# Patient Record
Sex: MALE | Race: ASIAN | NOT HISPANIC OR LATINO | URBAN - METROPOLITAN AREA
[De-identification: names, ages, dates, MRNs, and addresses within clinical notes are randomized per-mention and may not be internally consistent; named-entity substitution may affect disease eponyms.]

---

## 2023-07-18 ENCOUNTER — INPATIENT (INPATIENT)
Facility: HOSPITAL | Age: 53
LOS: 1 days | Discharge: HOME | End: 2023-07-20
Attending: INTERNAL MEDICINE | Admitting: INTERNAL MEDICINE
Payer: COMMERCIAL

## 2023-07-18 VITALS
DIASTOLIC BLOOD PRESSURE: 98 MMHG | TEMPERATURE: 98 F | HEIGHT: 67 IN | OXYGEN SATURATION: 98 % | HEART RATE: 78 BPM | SYSTOLIC BLOOD PRESSURE: 166 MMHG | RESPIRATION RATE: 18 BRPM | WEIGHT: 160.06 LBS

## 2023-07-18 LAB
ALBUMIN SERPL ELPH-MCNC: 4.4 G/DL — SIGNIFICANT CHANGE UP (ref 3.3–5)
ALP SERPL-CCNC: 94 U/L — SIGNIFICANT CHANGE UP (ref 40–120)
ALT FLD-CCNC: 46 U/L — SIGNIFICANT CHANGE UP (ref 12–78)
ANION GAP SERPL CALC-SCNC: 5 MMOL/L — SIGNIFICANT CHANGE UP (ref 5–17)
APPEARANCE UR: CLEAR — SIGNIFICANT CHANGE UP
AST SERPL-CCNC: 35 U/L — SIGNIFICANT CHANGE UP (ref 15–37)
BACTERIA # UR AUTO: NEGATIVE — SIGNIFICANT CHANGE UP
BASOPHILS # BLD AUTO: 0.09 K/UL — SIGNIFICANT CHANGE UP (ref 0–0.2)
BASOPHILS NFR BLD AUTO: 0.6 % — SIGNIFICANT CHANGE UP (ref 0–2)
BILIRUB SERPL-MCNC: 0.8 MG/DL — SIGNIFICANT CHANGE UP (ref 0.2–1.2)
BILIRUB UR-MCNC: NEGATIVE — SIGNIFICANT CHANGE UP
BUN SERPL-MCNC: 23 MG/DL — SIGNIFICANT CHANGE UP (ref 7–23)
CALCIUM SERPL-MCNC: 9.5 MG/DL — SIGNIFICANT CHANGE UP (ref 8.5–10.1)
CHLORIDE SERPL-SCNC: 101 MMOL/L — SIGNIFICANT CHANGE UP (ref 96–108)
CO2 SERPL-SCNC: 29 MMOL/L — SIGNIFICANT CHANGE UP (ref 22–31)
COLOR SPEC: YELLOW — SIGNIFICANT CHANGE UP
CREAT SERPL-MCNC: 1.23 MG/DL — SIGNIFICANT CHANGE UP (ref 0.5–1.3)
DIFF PNL FLD: NEGATIVE — SIGNIFICANT CHANGE UP
EGFR: 70 ML/MIN/1.73M2 — SIGNIFICANT CHANGE UP
EOSINOPHIL # BLD AUTO: 0.02 K/UL — SIGNIFICANT CHANGE UP (ref 0–0.5)
EOSINOPHIL NFR BLD AUTO: 0.1 % — SIGNIFICANT CHANGE UP (ref 0–6)
EPI CELLS # UR: SIGNIFICANT CHANGE UP
GLUCOSE SERPL-MCNC: 115 MG/DL — HIGH (ref 70–99)
GLUCOSE UR QL: NEGATIVE MG/DL — SIGNIFICANT CHANGE UP
HCT VFR BLD CALC: 47.3 % — SIGNIFICANT CHANGE UP (ref 39–50)
HGB BLD-MCNC: 16.7 G/DL — SIGNIFICANT CHANGE UP (ref 13–17)
IMM GRANULOCYTES NFR BLD AUTO: 0.7 % — SIGNIFICANT CHANGE UP (ref 0–0.9)
KETONES UR-MCNC: NEGATIVE — SIGNIFICANT CHANGE UP
LEUKOCYTE ESTERASE UR-ACNC: ABNORMAL
LIDOCAIN IGE QN: 1148 U/L — HIGH (ref 73–393)
LYMPHOCYTES # BLD AUTO: 1.71 K/UL — SIGNIFICANT CHANGE UP (ref 1–3.3)
LYMPHOCYTES # BLD AUTO: 11.5 % — LOW (ref 13–44)
MCHC RBC-ENTMCNC: 31.7 PG — SIGNIFICANT CHANGE UP (ref 27–34)
MCHC RBC-ENTMCNC: 35.3 G/DL — SIGNIFICANT CHANGE UP (ref 32–36)
MCV RBC AUTO: 89.8 FL — SIGNIFICANT CHANGE UP (ref 80–100)
MONOCYTES # BLD AUTO: 0.67 K/UL — SIGNIFICANT CHANGE UP (ref 0–0.9)
MONOCYTES NFR BLD AUTO: 4.5 % — SIGNIFICANT CHANGE UP (ref 2–14)
NEUTROPHILS # BLD AUTO: 12.25 K/UL — HIGH (ref 1.8–7.4)
NEUTROPHILS NFR BLD AUTO: 82.6 % — HIGH (ref 43–77)
NITRITE UR-MCNC: NEGATIVE — SIGNIFICANT CHANGE UP
NRBC # BLD: 0 /100 WBCS — SIGNIFICANT CHANGE UP (ref 0–0)
PH UR: 6 — SIGNIFICANT CHANGE UP (ref 5–8)
PLATELET # BLD AUTO: 299 K/UL — SIGNIFICANT CHANGE UP (ref 150–400)
POTASSIUM SERPL-MCNC: 4.2 MMOL/L — SIGNIFICANT CHANGE UP (ref 3.5–5.3)
POTASSIUM SERPL-SCNC: 4.2 MMOL/L — SIGNIFICANT CHANGE UP (ref 3.5–5.3)
PROT SERPL-MCNC: 8.2 GM/DL — SIGNIFICANT CHANGE UP (ref 6–8.3)
PROT UR-MCNC: 15 MG/DL
RBC # BLD: 5.27 M/UL — SIGNIFICANT CHANGE UP (ref 4.2–5.8)
RBC # FLD: 13.6 % — SIGNIFICANT CHANGE UP (ref 10.3–14.5)
RBC CASTS # UR COMP ASSIST: NEGATIVE /HPF — SIGNIFICANT CHANGE UP (ref 0–4)
SODIUM SERPL-SCNC: 135 MMOL/L — SIGNIFICANT CHANGE UP (ref 135–145)
SP GR SPEC: 1.02 — SIGNIFICANT CHANGE UP (ref 1.01–1.02)
UROBILINOGEN FLD QL: NEGATIVE MG/DL — SIGNIFICANT CHANGE UP
WBC # BLD: 14.86 K/UL — HIGH (ref 3.8–10.5)
WBC # FLD AUTO: 14.86 K/UL — HIGH (ref 3.8–10.5)
WBC UR QL: ABNORMAL

## 2023-07-18 PROCEDURE — 99223 1ST HOSP IP/OBS HIGH 75: CPT

## 2023-07-18 PROCEDURE — 99285 EMERGENCY DEPT VISIT HI MDM: CPT

## 2023-07-18 PROCEDURE — 93010 ELECTROCARDIOGRAM REPORT: CPT

## 2023-07-18 PROCEDURE — 76705 ECHO EXAM OF ABDOMEN: CPT | Mod: 26

## 2023-07-18 RX ORDER — SODIUM CHLORIDE 9 MG/ML
1000 INJECTION INTRAMUSCULAR; INTRAVENOUS; SUBCUTANEOUS
Refills: 0 | Status: DISCONTINUED | OUTPATIENT
Start: 2023-07-18 | End: 2023-07-20

## 2023-07-18 RX ORDER — OMEPRAZOLE 10 MG/1
1 CAPSULE, DELAYED RELEASE ORAL
Refills: 0 | DISCHARGE

## 2023-07-18 RX ORDER — MORPHINE SULFATE 50 MG/1
4 CAPSULE, EXTENDED RELEASE ORAL EVERY 4 HOURS
Refills: 0 | Status: DISCONTINUED | OUTPATIENT
Start: 2023-07-18 | End: 2023-07-20

## 2023-07-18 RX ORDER — NALOXONE HYDROCHLORIDE 4 MG/.1ML
0.4 SPRAY NASAL ONCE
Refills: 0 | Status: DISCONTINUED | OUTPATIENT
Start: 2023-07-18 | End: 2023-07-20

## 2023-07-18 RX ORDER — SODIUM CHLORIDE 9 MG/ML
2000 INJECTION INTRAMUSCULAR; INTRAVENOUS; SUBCUTANEOUS ONCE
Refills: 0 | Status: COMPLETED | OUTPATIENT
Start: 2023-07-18 | End: 2023-07-18

## 2023-07-18 RX ORDER — SIMVASTATIN 20 MG/1
1 TABLET, FILM COATED ORAL
Refills: 0 | DISCHARGE

## 2023-07-18 RX ORDER — KETOROLAC TROMETHAMINE 30 MG/ML
15 SYRINGE (ML) INJECTION ONCE
Refills: 0 | Status: DISCONTINUED | OUTPATIENT
Start: 2023-07-18 | End: 2023-07-18

## 2023-07-18 RX ORDER — ONDANSETRON 8 MG/1
4 TABLET, FILM COATED ORAL EVERY 6 HOURS
Refills: 0 | Status: DISCONTINUED | OUTPATIENT
Start: 2023-07-18 | End: 2023-07-20

## 2023-07-18 RX ORDER — ATORVASTATIN CALCIUM 80 MG/1
40 TABLET, FILM COATED ORAL AT BEDTIME
Refills: 0 | Status: DISCONTINUED | OUTPATIENT
Start: 2023-07-18 | End: 2023-07-20

## 2023-07-18 RX ORDER — FENOFIBRATE,MICRONIZED 130 MG
1 CAPSULE ORAL
Refills: 0 | DISCHARGE

## 2023-07-18 RX ORDER — LANOLIN ALCOHOL/MO/W.PET/CERES
3 CREAM (GRAM) TOPICAL AT BEDTIME
Refills: 0 | Status: DISCONTINUED | OUTPATIENT
Start: 2023-07-18 | End: 2023-07-20

## 2023-07-18 RX ORDER — MORPHINE SULFATE 50 MG/1
2 CAPSULE, EXTENDED RELEASE ORAL EVERY 4 HOURS
Refills: 0 | Status: DISCONTINUED | OUTPATIENT
Start: 2023-07-18 | End: 2023-07-20

## 2023-07-18 RX ORDER — FENOFIBRATE,MICRONIZED 130 MG
48 CAPSULE ORAL DAILY
Refills: 0 | Status: DISCONTINUED | OUTPATIENT
Start: 2023-07-18 | End: 2023-07-20

## 2023-07-18 RX ORDER — HYDROMORPHONE HYDROCHLORIDE 2 MG/ML
0.5 INJECTION INTRAMUSCULAR; INTRAVENOUS; SUBCUTANEOUS ONCE
Refills: 0 | Status: DISCONTINUED | OUTPATIENT
Start: 2023-07-18 | End: 2023-07-18

## 2023-07-18 RX ORDER — POLYETHYLENE GLYCOL 3350 17 G/17G
17 POWDER, FOR SOLUTION ORAL DAILY
Refills: 0 | Status: DISCONTINUED | OUTPATIENT
Start: 2023-07-18 | End: 2023-07-20

## 2023-07-18 RX ORDER — PANTOPRAZOLE SODIUM 20 MG/1
40 TABLET, DELAYED RELEASE ORAL
Refills: 0 | Status: DISCONTINUED | OUTPATIENT
Start: 2023-07-18 | End: 2023-07-20

## 2023-07-18 RX ORDER — ACETAMINOPHEN 500 MG
650 TABLET ORAL EVERY 6 HOURS
Refills: 0 | Status: DISCONTINUED | OUTPATIENT
Start: 2023-07-18 | End: 2023-07-20

## 2023-07-18 RX ORDER — SENNA PLUS 8.6 MG/1
2 TABLET ORAL AT BEDTIME
Refills: 0 | Status: DISCONTINUED | OUTPATIENT
Start: 2023-07-18 | End: 2023-07-20

## 2023-07-18 RX ORDER — ONDANSETRON 8 MG/1
4 TABLET, FILM COATED ORAL ONCE
Refills: 0 | Status: DISCONTINUED | OUTPATIENT
Start: 2023-07-18 | End: 2023-07-20

## 2023-07-18 RX ADMIN — Medication 15 MILLIGRAM(S): at 16:05

## 2023-07-18 RX ADMIN — SODIUM CHLORIDE 2000 MILLILITER(S): 9 INJECTION INTRAMUSCULAR; INTRAVENOUS; SUBCUTANEOUS at 16:05

## 2023-07-18 RX ADMIN — Medication 48 MILLIGRAM(S): at 21:24

## 2023-07-18 RX ADMIN — Medication 15 MILLIGRAM(S): at 17:51

## 2023-07-18 RX ADMIN — MORPHINE SULFATE 2 MILLIGRAM(S): 50 CAPSULE, EXTENDED RELEASE ORAL at 21:36

## 2023-07-18 RX ADMIN — SENNA PLUS 2 TABLET(S): 8.6 TABLET ORAL at 21:41

## 2023-07-18 RX ADMIN — HYDROMORPHONE HYDROCHLORIDE 0.5 MILLIGRAM(S): 2 INJECTION INTRAMUSCULAR; INTRAVENOUS; SUBCUTANEOUS at 18:46

## 2023-07-18 RX ADMIN — SODIUM CHLORIDE 150 MILLILITER(S): 9 INJECTION INTRAMUSCULAR; INTRAVENOUS; SUBCUTANEOUS at 21:36

## 2023-07-18 RX ADMIN — ATORVASTATIN CALCIUM 40 MILLIGRAM(S): 80 TABLET, FILM COATED ORAL at 21:24

## 2023-07-18 RX ADMIN — Medication 30 MILLILITER(S): at 16:09

## 2023-07-18 NOTE — ED ADULT NURSE NOTE - BREATHING, MLM
"SPIRITUAL HEALTH SERVICES Progress Note  [facility]    Visited pt due to staff request. I checked in with unit staff and then introduced myself to pt.    Bella was tearful and agreed by nodding yes to me staying with her. She reached for my hand and we held hands while I offered words of assurance for our care for her and presence wit her. Pt nodded yes to me and asked for water. I confirmed this with staff and fed pt an ice chip. Pt appeared more relaxed and I offered to return to check in on her tomorrow and she said yes and \"I love you\". I offered more words of blessing and care for Bella.    SHS remains available.      Kathy Brown  Chaplain Resident    " Spontaneous, unlabored and symmetrical

## 2023-07-18 NOTE — H&P ADULT - NSHPPHYSICALEXAM_GEN_ALL_CORE
T(C): 36.7 (07-19-23 @ 05:32), Max: 36.9 (07-18-23 @ 22:37)  HR: 68 (07-19-23 @ 05:32) (64 - 78)  BP: 169/87 (07-19-23 @ 05:32) (161/97 - 169/87)  RR: 18 (07-19-23 @ 05:32) (14 - 18)  SpO2: 97% (07-19-23 @ 05:32) (96% - 98%)    CONSTITUTIONAL: No apparent distress  EYES: PERRLA and symmetric, EOMI, No conjunctival or scleral injection, non-icteric  ENMT: Oral mucosa with moist membranes.  NECK: Supple, symmetric. No JVD.  RESP: No respiratory distress, no use of accessory muscles; CTA b/l, no WRR  CV: RRR, +S1S2, no MRG  GI: Soft, Epigastric tenderness, ND, no rebound, no guarding  : No suprapubic tenderness. No CVA tenderness.  LYMPH: No cervical LAD or tenderness  MSK: No spinal tenderness, normal muscle strength/tone  EXTREMITIES: No pedal edema  SKIN: No rashes or ulcers noted  NEURO: A+Ox3, responsive. No tremor, sensation intact in upper and lower extremities b/l  PSYCH: Appropriate insight/judgment; mood and affect appropriate, recent/remote memory intact

## 2023-07-18 NOTE — PATIENT PROFILE ADULT - FUNCTIONAL SCREEN CURRENT LEVEL: SWALLOWING (IF SCORE 2 OR MORE FOR ANY ITEM, CONSULT REHAB SERVICES), MLM)
[FreeTextEntry1] : 77 year old gentleman with history of HTN, DM2, CKD, Bell's Palsy, pulm HTN, CAD s/p prior PCI & remote CABG c/b sternal wound infection requiring sternum removal with pec flap, ischemic cardiomyopathy, chronic HFrEF (variable EF 30-41%), AFL/AFIB s/p MARLO/DCCV 5/11/21 with CHADS-VASc = 6, bifascicular block s/p MDT ILR implant (5/2021-[facility]) and GI bleed secondary to duodenal diverticulum s/p coil embolization 6/5/21. Now  status post uncomplicated KAROLINA occlusion with Watchman device 7/27/21.\par \par Presents today for post watchman f/u. Since implant he reports he has been feeling well. Followed up with Nephrology Dr. Mims as advised. Denies easy bruising, bleeding, or falls maintained on Eliquis. Denies palpitations, lightheadedness, dizziness, near syncope, syncope. ILR reveals AT/AF burden 0.1% since last follow up, many episodes c/w SR with ectopy. No tachy or didi events. PVC burden 2%. 
0 = swallows foods/liquids without difficulty

## 2023-07-18 NOTE — H&P ADULT - HISTORY OF PRESENT ILLNESS
53-year-old male with no significant past medical history presents for evaluation of upper abdominal pain. Patient states that symptoms started abruptly this morning.  He describes a sharp epigastric pain that is constant, worse with palpation. He went to an urgent care and they recommended he come here for further evaluation.  Denies any history of abdominal surgeries. No hx of gallstones. Denies eating high fat foods. Reports drinking about 2 drinks almost every day for years. Has never had withdrawal symptoms. Has medical marijuana license but uses marijuana sparingly. Smokes 0.5ppd. Denies any recent changes in his bowel movements such as constipation, diarrhea, or dark or bloody bowel movements.  Denies any urinary complaints.  Denies nausea or vomiting.  He states he has had similar pain in the past and was told it close due to gas.

## 2023-07-18 NOTE — ED ADULT NURSE NOTE - OBJECTIVE STATEMENT
Patient is alert and oriented x4. Came in for generalized abdominal pain that started this morning. Denies any n/v/d. Denies fevers or chills. Drinks alcohol everyday. PMH of hyperlipidemia. Abdomen soft, nondistended. Denies any urinary symptoms.

## 2023-07-18 NOTE — PATIENT PROFILE ADULT - FALL HARM RISK - UNIVERSAL INTERVENTIONS
Bed in lowest position, wheels locked, appropriate side rails in place/Call bell, personal items and telephone in reach/Instruct patient to call for assistance before getting out of bed or chair/Non-slip footwear when patient is out of bed/[place] to call system/Physically safe environment - no spills, clutter or unnecessary equipment/Purposeful Proactive Rounding/Room/bathroom lighting operational, light cord in reach

## 2023-07-18 NOTE — ED ADULT NURSE NOTE - NURSING GU BLADDER
Pt states she needs to complete CT scan at [facility] or the [facility]. Please advise [phone]. non-distended

## 2023-07-18 NOTE — ED PROVIDER NOTE - OBJECTIVE STATEMENT
Patient is a 53-year-old male with no significant past medical history, who presents for evaluation of upper abdominal pain.  Patient states that symptoms started abruptly this morning.  He describes a sharp aching pain in his upper abdomen that is constant, worse with palpation but without any other provoking remitting factors, moderate severity.  He went to an urgent care and they recommended he come here for further evaluation.  Denies any history of abdominal surgeries.  Denies any recent changes in his bowel movements such as constipation, diarrhea, or dark or bloody bowel movements.  Denies any urinary complaints.  Denies nausea or vomiting.  He states he has had similar pain in the past and was told it close due to gas.  He offers no additional appointments.

## 2023-07-18 NOTE — ED ADULT NURSE NOTE - NSFALLUNIVINTERV_ED_ALL_ED
Bed/Stretcher in lowest position, wheels locked, appropriate side rails in place/Call bell, personal items and telephone in reach/Instruct patient to call for assistance before getting out of bed/chair/stretcher/Non-slip footwear applied when patient is off stretcher/[place] to call system/Physically safe environment - no spills, clutter or unnecessary equipment/Purposeful proactive rounding/Room/bathroom lighting operational, light cord in reach

## 2023-07-18 NOTE — ED PROVIDER NOTE - CLINICAL SUMMARY MEDICAL DECISION MAKING FREE TEXT BOX
Upon initial presentation patient sitting up in bed in no acute distress.  He is mildly uncomfortable appearing but otherwise nontoxic.  Has epigastric discomfort reproducible on examination.  Reports a history of similar pain in the past which was attributed to gas.  IV access obtained and patient was initially treated with GI cocktail and IV fluids.  Reports some improvement in pain on reevaluation.  Laboratory work-up does reveal evidence of acute pancreatitis with lipase of over thousand.  He has no obstructive biliary process on remainder of his LFTs.  Ultrasound was obtained and reveals hepatic steatosis but otherwise unremarkable.  I suspect his pancreatitis is most likely secondary to his alcohol use.  Given patient also noted to have elevated white blood cell count with pancreatitis I do feel he would benefit from admission for observation, IV fluids, and repeat laboratory work-up.  His case was discussed with internal medicine and who kindly agreed to admit the patient for further management.  He was admitted in stable condition.

## 2023-07-18 NOTE — H&P ADULT - ASSESSMENT
#Pancreatitis     #Alcohol abuse    #HLD  - Continue home simvastatin, fenofibrate  - F/u lipid panel #Pancreatitis   - Likely due to alcohol use  - Lipase elevated, epigastric pain radiating to back  - Abdominal US negative for gallstones, LFTs normal  - NS@150  - Morphine PRN pain  - Clear liquid diet advance as tolerated    #Alcohol abuse  - Discussed alcohol cessation, patient agreed  - has not had withdrawal symptoms ever    #HLD  - Continue home simvastatin, fenofibrate  - F/u lipid panel    Code: Full

## 2023-07-18 NOTE — PATIENT PROFILE ADULT - HOME ACCESSIBILITY CONCERNS
[General Appearance - Alert] : alert [General Appearance - In No Acute Distress] : in no acute distress [General Appearance - Well Nourished] : well nourished [General Appearance - Well Developed] : well developed [General Appearance - Well-Appearing] : healthy appearing [Sclera] : the sclera and conjunctiva were normal [Outer Ear] : the ears and nose were normal in appearance [Nasal Cavity] : the nasal mucosa and septum were normal [Auscultation Breath Sounds / Voice Sounds] : lungs were clear to auscultation bilaterally [Heart Rate And Rhythm] : heart rate was normal and rhythm regular [Heart Sounds] : normal S1 and S2 none [Heart Sounds Gallop] : no gallops [Murmurs] : no murmurs [Heart Sounds Pericardial Friction Rub] : no pericardial rub [Full Pulse] : the pedal pulses are present [Edema] : there was no peripheral edema [No CVA Tenderness] : no ~M costovertebral angle tenderness [Abnormal Walk] : normal gait [Nail Clubbing] : no clubbing  or cyanosis of the fingernails [Musculoskeletal - Swelling] : no joint swelling seen [Motor Tone] : muscle strength and tone were normal [Sensation] : the sensory exam was normal to light touch and pinprick [No Focal Deficits] : no focal deficits [Oriented To Time, Place, And Person] : oriented to person, place, and time [Impaired Insight] : insight and judgment were intact [Affect] : the affect was normal [Skin Color & Pigmentation] : normal skin color and pigmentation [Skin Turgor] : normal skin turgor [] : no rash [Mood] : the mood was normal [FreeTextEntry1] : feeling much better with + response to Tx

## 2023-07-18 NOTE — ED ADULT TRIAGE NOTE - CHIEF COMPLAINT QUOTE
pt c/o epigastric abdominal pain radiating to the back since this morning. denies N/V/D or constipation. denies gu symptoms. sent from urgent care for ct. history of high cholesterol.

## 2023-07-18 NOTE — ED ADULT NURSE NOTE - AS PAIN REST
[facility]  OPERATIVE REPORT    Name:  Fabiano Nagy  MR#:  [id number]  :  1966  ACCOUNT #:  [de-identified]  DATE OF SERVICE:  2023    PREOPERATIVE DIAGNOSIS:  Left renal calculus. POSTOPERATIVE DIAGNOSIS:  Left renal calculus. PROCEDURE PERFORMED:  Left extracorporeal shock wave lithotripsy. SURGEON:  Ron Mendoza MD    ASSISTANT:  None. ANESTHESIA:  General.    COMPLICATIONS:  None. SPECIMENS REMOVED:  None. IMPLANTS:  None. ESTIMATED BLOOD LOSS:  None. FINDINGS:  A 19 x 15 mm calculus located in the left superolateral kidney. PROCEDURE:  The patient was given general anesthetic, placed in the supine position on the lithotripsy treatment table. Fluoroscopy was used to identify the stone. This is a triangular-shaped dense-appearing stone in the left superolateral calyx. Measures 19 x 15 mm. Treatment was begun at low treatment setting, gradually increased up to a power setting of a 11 kV. A total of 3000 shocks were given. Stone showed minimal change after the lithotripsy. PLAN:  He will be discharged home to return to my office in two to three weeks for KUB and discuss possible further management of stone.       MD ROXANE [email or web address]  D:  2023 9:13  T:  2023 11:06  JOB #:  [id number]
10 (severe pain)

## 2023-07-18 NOTE — ED ADULT TRIAGE NOTE - CCCP TRG CHIEF CMPLNT
[Use of Plain Language] : use of plain language [Education Material/Resources Provided] : education material/resources provided [Adequate] : adequate [None] : none abdominal pain

## 2023-07-18 NOTE — H&P ADULT - NSHPLABSRESULTS_GEN_ALL_CORE
16.7   14.86 )-----------( 299      ( 18 Jul 2023 15:59 )             47.3       07-18    135  |  101  |  23  ----------------------------<  115<H>  4.2   |  29  |  1.23    Ca    9.5      18 Jul 2023 15:59    TPro  8.2  /  Alb  4.4  /  TBili  0.8  /  DBili  x   /  AST  35  /  ALT  46  /  AlkPhos  94  07-18

## 2023-07-18 NOTE — ED ADULT NURSE NOTE - NSFALLRISKASMT_ED_ALL_ED_DT
18-Jul-2023 16:13 Tetracycline Counseling: Patient counseled regarding possible photosensitivity and increased risk for sunburn.  Patient instructed to avoid sunlight, if possible.  When exposed to sunlight, patients should wear protective clothing, sunglasses, and sunscreen.  The patient was instructed to call the office immediately if the following severe adverse effects occur:  hearing changes, easy bruising/bleeding, severe headache, or vision changes.  The patient verbalized understanding of the proper use and possible adverse effects of tetracycline.  All of the patient's questions and concerns were addressed. Patient understands to avoid pregnancy while on therapy due to potential birth defects.

## 2023-07-18 NOTE — PATIENT PROFILE ADULT - HAVE YOU BEEN EATING POORLY BECAUSE OF A DECREASED APPETITE?
No (0) I will SWITCH the dose or number of times a day I take the medications listed below when I get home from the hospital:  None

## 2023-07-19 DIAGNOSIS — K85.90 ACUTE PANCREATITIS WITHOUT NECROSIS OR INFECTION, UNSPECIFIED: ICD-10-CM

## 2023-07-19 DIAGNOSIS — F10.10 ALCOHOL ABUSE, UNCOMPLICATED: ICD-10-CM

## 2023-07-19 LAB
A1C WITH ESTIMATED AVERAGE GLUCOSE RESULT: 5.8 % — HIGH (ref 4–5.6)
ALBUMIN SERPL ELPH-MCNC: 3.2 G/DL — LOW (ref 3.3–5)
ALP SERPL-CCNC: 77 U/L — SIGNIFICANT CHANGE UP (ref 40–120)
ALT FLD-CCNC: 30 U/L — SIGNIFICANT CHANGE UP (ref 12–78)
ANION GAP SERPL CALC-SCNC: 4 MMOL/L — LOW (ref 5–17)
AST SERPL-CCNC: 21 U/L — SIGNIFICANT CHANGE UP (ref 15–37)
BILIRUB SERPL-MCNC: 1.1 MG/DL — SIGNIFICANT CHANGE UP (ref 0.2–1.2)
BUN SERPL-MCNC: 19 MG/DL — SIGNIFICANT CHANGE UP (ref 7–23)
CALCIUM SERPL-MCNC: 8 MG/DL — LOW (ref 8.5–10.1)
CHLORIDE SERPL-SCNC: 106 MMOL/L — SIGNIFICANT CHANGE UP (ref 96–108)
CHOLEST SERPL-MCNC: 171 MG/DL — SIGNIFICANT CHANGE UP
CO2 SERPL-SCNC: 26 MMOL/L — SIGNIFICANT CHANGE UP (ref 22–31)
CREAT SERPL-MCNC: 1.04 MG/DL — SIGNIFICANT CHANGE UP (ref 0.5–1.3)
EGFR: 85 ML/MIN/1.73M2 — SIGNIFICANT CHANGE UP
ESTIMATED AVERAGE GLUCOSE: 120 MG/DL — HIGH (ref 68–114)
GLUCOSE SERPL-MCNC: 98 MG/DL — SIGNIFICANT CHANGE UP (ref 70–99)
HCT VFR BLD CALC: 42.1 % — SIGNIFICANT CHANGE UP (ref 39–50)
HDLC SERPL-MCNC: 60 MG/DL — SIGNIFICANT CHANGE UP
HGB BLD-MCNC: 14.7 G/DL — SIGNIFICANT CHANGE UP (ref 13–17)
LIPID PNL WITH DIRECT LDL SERPL: 77 MG/DL — SIGNIFICANT CHANGE UP
MCHC RBC-ENTMCNC: 31.2 PG — SIGNIFICANT CHANGE UP (ref 27–34)
MCHC RBC-ENTMCNC: 34.9 G/DL — SIGNIFICANT CHANGE UP (ref 32–36)
MCV RBC AUTO: 89.4 FL — SIGNIFICANT CHANGE UP (ref 80–100)
NON HDL CHOLESTEROL: 112 MG/DL — SIGNIFICANT CHANGE UP
NRBC # BLD: 0 /100 WBCS — SIGNIFICANT CHANGE UP (ref 0–0)
PLATELET # BLD AUTO: 244 K/UL — SIGNIFICANT CHANGE UP (ref 150–400)
POTASSIUM SERPL-MCNC: 3.8 MMOL/L — SIGNIFICANT CHANGE UP (ref 3.5–5.3)
POTASSIUM SERPL-SCNC: 3.8 MMOL/L — SIGNIFICANT CHANGE UP (ref 3.5–5.3)
PROT SERPL-MCNC: 6.3 GM/DL — SIGNIFICANT CHANGE UP (ref 6–8.3)
RBC # BLD: 4.71 M/UL — SIGNIFICANT CHANGE UP (ref 4.2–5.8)
RBC # FLD: 13.3 % — SIGNIFICANT CHANGE UP (ref 10.3–14.5)
SODIUM SERPL-SCNC: 136 MMOL/L — SIGNIFICANT CHANGE UP (ref 135–145)
TRIGL SERPL-MCNC: 208 MG/DL — HIGH
WBC # BLD: 11.55 K/UL — HIGH (ref 3.8–10.5)
WBC # FLD AUTO: 11.55 K/UL — HIGH (ref 3.8–10.5)

## 2023-07-19 PROCEDURE — 99233 SBSQ HOSP IP/OBS HIGH 50: CPT

## 2023-07-19 RX ADMIN — MORPHINE SULFATE 4 MILLIGRAM(S): 50 CAPSULE, EXTENDED RELEASE ORAL at 11:34

## 2023-07-19 RX ADMIN — SODIUM CHLORIDE 150 MILLILITER(S): 9 INJECTION INTRAMUSCULAR; INTRAVENOUS; SUBCUTANEOUS at 05:39

## 2023-07-19 RX ADMIN — MORPHINE SULFATE 4 MILLIGRAM(S): 50 CAPSULE, EXTENDED RELEASE ORAL at 21:06

## 2023-07-19 RX ADMIN — ATORVASTATIN CALCIUM 40 MILLIGRAM(S): 80 TABLET, FILM COATED ORAL at 21:06

## 2023-07-19 RX ADMIN — SODIUM CHLORIDE 150 MILLILITER(S): 9 INJECTION INTRAMUSCULAR; INTRAVENOUS; SUBCUTANEOUS at 16:07

## 2023-07-19 RX ADMIN — MORPHINE SULFATE 2 MILLIGRAM(S): 50 CAPSULE, EXTENDED RELEASE ORAL at 06:09

## 2023-07-19 RX ADMIN — Medication 3 MILLIGRAM(S): at 01:23

## 2023-07-19 RX ADMIN — Medication 650 MILLIGRAM(S): at 15:34

## 2023-07-19 RX ADMIN — SENNA PLUS 2 TABLET(S): 8.6 TABLET ORAL at 21:06

## 2023-07-19 RX ADMIN — Medication 650 MILLIGRAM(S): at 07:54

## 2023-07-19 RX ADMIN — POLYETHYLENE GLYCOL 3350 17 GRAM(S): 17 POWDER, FOR SOLUTION ORAL at 13:26

## 2023-07-19 RX ADMIN — MORPHINE SULFATE 2 MILLIGRAM(S): 50 CAPSULE, EXTENDED RELEASE ORAL at 05:39

## 2023-07-19 RX ADMIN — MORPHINE SULFATE 4 MILLIGRAM(S): 50 CAPSULE, EXTENDED RELEASE ORAL at 21:21

## 2023-07-19 RX ADMIN — MORPHINE SULFATE 2 MILLIGRAM(S): 50 CAPSULE, EXTENDED RELEASE ORAL at 15:35

## 2023-07-19 RX ADMIN — Medication 48 MILLIGRAM(S): at 13:26

## 2023-07-19 RX ADMIN — PANTOPRAZOLE SODIUM 40 MILLIGRAM(S): 20 TABLET, DELAYED RELEASE ORAL at 07:55

## 2023-07-19 NOTE — PROGRESS NOTE ADULT - ASSESSMENT
#acute Pancreatitis   - Likely due to alcohol use  - Lipase elevated, epigastric pain radiating to back  - Abdominal US negative for gallstones, LFTs normal  - NS@150  - Morphine PRN pain  - Clear liquid diet advance as tolerated    #Alcohol depence  - Discussed alcohol cessation, patient agreed  - has not had withdrawal symptoms ever    #HLD  - Continue home simvastatin, fenofibrate  - F/u lipid panel    Code: Full

## 2023-07-19 NOTE — PROGRESS NOTE ADULT - SUBJECTIVE AND OBJECTIVE BOX
Patient is a 53y old  Male who presents with a chief complaint of Pancreatitis (18 Jul 2023 20:18)      OVERNIGHT EVENTS:  Patients seen and examined at bedside this morning. No acute events overnight.    REVIEW OF SYSTEMS: denies chest pain/SOB, diaphoresis, no F/C, cough, dizziness, headache, blurry vision, nausea, vomiting, abdominal pain. All others review of systems negative     MEDICATIONS  (STANDING):  atorvastatin 40 milliGRAM(s) Oral at bedtime  fenofibrate Tablet 48 milliGRAM(s) Oral daily  naloxone Injectable 0.4 milliGRAM(s) IV Push once  ondansetron Injectable 4 milliGRAM(s) IV Push once  pantoprazole    Tablet 40 milliGRAM(s) Oral before breakfast  polyethylene glycol 3350 17 Gram(s) Oral daily  senna 2 Tablet(s) Oral at bedtime  sodium chloride 0.9%. 1000 milliLiter(s) (150 mL/Hr) IV Continuous <Continuous>    MEDICATIONS  (PRN):  acetaminophen     Tablet .. 650 milliGRAM(s) Oral every 6 hours PRN Temp greater or equal to 38C (100.4F), Mild Pain (1 - 3)  bisacodyl 5 milliGRAM(s) Oral daily PRN Constipation  melatonin 3 milliGRAM(s) Oral at bedtime PRN Insomnia  morphine  - Injectable 4 milliGRAM(s) IV Push every 4 hours PRN Severe Pain (7 - 10)  morphine  - Injectable 2 milliGRAM(s) IV Push every 4 hours PRN Moderate Pain (4 - 6)  ondansetron Injectable 4 milliGRAM(s) IV Push every 6 hours PRN Nausea and/or Vomiting      Allergies    No Known Allergies    Intolerances        T(F): 98.7 (07-19-23 @ 11:16), Max: 98.7 (07-19-23 @ 11:16)  HR: 59 (07-19-23 @ 11:16) (59 - 74)  BP: 169/87 (07-19-23 @ 05:32) (161/97 - 169/87)  RR: 19 (07-19-23 @ 11:16) (14 - 19)  SpO2: 99% (07-19-23 @ 11:16) (96% - 99%)  Wt(kg): --    PHYSICAL EXAM:  GENERAL: NAD  HEAD:  Atraumatic, Normocephalic  EYES: PERRLA, conjunctiva and sclera clear  ENMT: Moist mucous membranes  NECK: Supple, No JVD, Normal thyroid  NERVOUS SYSTEM:  Alert & Awake  CHEST/LUNG: Clear to percussion bilaterally;   HEART: Regular rate and rhythm;   ABDOMEN: Soft, Nontender, Nondistended; Bowel sounds present  EXTREMITIES:  no edema BL LE  SKIN: moist    LABS:                        14.7   11.55 )-----------( 244      ( 19 Jul 2023 06:15 )             42.1     07-19    136  |  106  |  19  ----------------------------<  98  3.8   |  26  |  1.04    Ca    8.0<L>      19 Jul 2023 06:15    TPro  6.3  /  Alb  3.2<L>  /  TBili  1.1  /  DBili  x   /  AST  21  /  ALT  30  /  AlkPhos  77  07-19      Urinalysis Basic - ( 19 Jul 2023 06:15 )    Color: x / Appearance: x / SG: x / pH: x  Gluc: 98 mg/dL / Ketone: x  / Bili: x / Urobili: x   Blood: x / Protein: x / Nitrite: x   Leuk Esterase: x / RBC: x / WBC x   Sq Epi: x / Non Sq Epi: x / Bacteria: x      Cultures;   CAPILLARY BLOOD GLUCOSE        Lipid panel:   LDL --            RADIOLOGY & ADDITIONAL TESTS:    Imaging Personally Reviewed:  [x ] YES      Consultant(s) Notes Reviewed:  [x ] YES     Care Discussed with [x ] Consultants [X ] Patient [ ] Family  [x ]    [x ]  Other; RN

## 2023-07-20 VITALS
HEART RATE: 72 BPM | RESPIRATION RATE: 18 BRPM | SYSTOLIC BLOOD PRESSURE: 149 MMHG | OXYGEN SATURATION: 99 % | TEMPERATURE: 98 F | DIASTOLIC BLOOD PRESSURE: 85 MMHG

## 2023-07-20 LAB
HCT VFR BLD CALC: 38.8 % — LOW (ref 39–50)
HGB BLD-MCNC: 13.8 G/DL — SIGNIFICANT CHANGE UP (ref 13–17)
MCHC RBC-ENTMCNC: 31.7 PG — SIGNIFICANT CHANGE UP (ref 27–34)
MCHC RBC-ENTMCNC: 35.6 G/DL — SIGNIFICANT CHANGE UP (ref 32–36)
MCV RBC AUTO: 89 FL — SIGNIFICANT CHANGE UP (ref 80–100)
NRBC # BLD: 0 /100 WBCS — SIGNIFICANT CHANGE UP (ref 0–0)
PLATELET # BLD AUTO: 218 K/UL — SIGNIFICANT CHANGE UP (ref 150–400)
RBC # BLD: 4.36 M/UL — SIGNIFICANT CHANGE UP (ref 4.2–5.8)
RBC # FLD: 13.4 % — SIGNIFICANT CHANGE UP (ref 10.3–14.5)
WBC # BLD: 10.35 K/UL — SIGNIFICANT CHANGE UP (ref 3.8–10.5)
WBC # FLD AUTO: 10.35 K/UL — SIGNIFICANT CHANGE UP (ref 3.8–10.5)

## 2023-07-20 PROCEDURE — 99239 HOSP IP/OBS DSCHRG MGMT >30: CPT

## 2023-07-20 RX ADMIN — MORPHINE SULFATE 4 MILLIGRAM(S): 50 CAPSULE, EXTENDED RELEASE ORAL at 05:05

## 2023-07-20 RX ADMIN — PANTOPRAZOLE SODIUM 40 MILLIGRAM(S): 20 TABLET, DELAYED RELEASE ORAL at 05:10

## 2023-07-20 RX ADMIN — Medication 48 MILLIGRAM(S): at 11:35

## 2023-07-20 RX ADMIN — MORPHINE SULFATE 4 MILLIGRAM(S): 50 CAPSULE, EXTENDED RELEASE ORAL at 04:05

## 2023-07-20 NOTE — DISCHARGE NOTE PROVIDER - ATTENDING DISCHARGE PHYSICAL EXAMINATION:
Vital Signs Last 24 Hrs  T(C): 36.9 (20 Jul 2023 10:43), Max: 36.9 (19 Jul 2023 17:11)  T(F): 98.4 (20 Jul 2023 10:43), Max: 98.5 (19 Jul 2023 17:11)  HR: 72 (20 Jul 2023 10:43) (64 - 77)  BP: 149/85 (20 Jul 2023 10:43) (149/85 - 169/89)  BP(mean): --  RR: 18 (20 Jul 2023 10:43) (18 - 18)  SpO2: 99% (20 Jul 2023 10:43) (97% - 99%)    Parameters below as of 20 Jul 2023 10:43  Patient On (Oxygen Delivery Method): room air            GENERAL: NAD  HEAD:  Atraumatic, Normocephalic  EYES: EOMI, PERRLA, conjunctiva and sclera clear  ENMT: No tonsillar erythema, exudates, or enlargement; Moist mucous membranes, Good dentition, No lesions  NECK: Supple, No JVD, Normal thyroid  NERVOUS SYSTEM:  Alert & Oriented X3, Good concentration; Motor Strength 5/5 B/L upper and lower extremities;   CHEST/LUNG: Clear to percussion bilaterally;   HEART: Regular rate and rhythm;   ABDOMEN: Soft, Nontender, Nondistended; Bowel sounds present  EXTREMITIES:  2+ Peripheral Pulses, No clubbing, cyanosis, or edema  SKIN: No rashes or lesions

## 2023-07-20 NOTE — DISCHARGE NOTE PROVIDER - NSDCMRMEDTOKEN_GEN_ALL_CORE_FT
fenofibrate 48 mg oral tablet: 1 orally once a day  omeprazole 40 mg oral delayed release capsule: 1 orally once a day  simvastatin 20 mg oral tablet: 1 orally once a day

## 2023-07-20 NOTE — DISCHARGE NOTE PROVIDER - NSDCCPCAREPLAN_GEN_ALL_CORE_FT
PRINCIPAL DISCHARGE DIAGNOSIS  Diagnosis: Pancreatitis  Assessment and Plan of Treatment: #acute Pancreatitis   - Likely due to alcohol use  - Lipase elevated, epigastric pain radiating to back  - Abdominal US negative for gallstones, LFTs normal  - NS@150  - Morphine PRN pain  - Clear liquid diet advance as tolerated  #Alcohol depence  - Discussed alcohol cessation, patient agreed  - has not had withdrawal symptoms ever  #HLD  - Continue home simvastatin, fenofibrate

## 2023-07-20 NOTE — DISCHARGE NOTE PROVIDER - HOSPITAL COURSE
#acute Pancreatitis   - Likely due to alcohol use  - Lipase elevated, epigastric pain radiating to back  - Abdominal US negative for gallstones, LFTs normal  - NS@150  - Morphine PRN pain  - Clear liquid diet advance as tolerated    #Alcohol depence  - Discussed alcohol cessation, patient agreed  - has not had withdrawal symptoms ever    #HLD  - Continue home simvastatin, fenofibrate

## 2023-07-26 DIAGNOSIS — E78.5 HYPERLIPIDEMIA, UNSPECIFIED: ICD-10-CM

## 2023-07-26 DIAGNOSIS — K76.0 FATTY (CHANGE OF) LIVER, NOT ELSEWHERE CLASSIFIED: ICD-10-CM

## 2023-07-26 DIAGNOSIS — F17.210 NICOTINE DEPENDENCE, CIGARETTES, UNCOMPLICATED: ICD-10-CM

## 2023-07-26 DIAGNOSIS — Z79.899 OTHER LONG TERM (CURRENT) DRUG THERAPY: ICD-10-CM

## 2023-07-26 DIAGNOSIS — K85.20 ALCOHOL INDUCED ACUTE PANCREATITIS WITHOUT NECROSIS OR INFECTION: ICD-10-CM

## 2023-07-26 DIAGNOSIS — F10.20 ALCOHOL DEPENDENCE, UNCOMPLICATED: ICD-10-CM

## 2023-07-26 DIAGNOSIS — Z71.41 ALCOHOL ABUSE COUNSELING AND SURVEILLANCE OF ALCOHOLIC: ICD-10-CM

## 2023-07-26 DIAGNOSIS — R10.13 EPIGASTRIC PAIN: ICD-10-CM
